# Patient Record
Sex: FEMALE | Race: BLACK OR AFRICAN AMERICAN | NOT HISPANIC OR LATINO | Employment: UNEMPLOYED | URBAN - METROPOLITAN AREA
[De-identification: names, ages, dates, MRNs, and addresses within clinical notes are randomized per-mention and may not be internally consistent; named-entity substitution may affect disease eponyms.]

---

## 2017-06-29 ENCOUNTER — APPOINTMENT (OUTPATIENT)
Dept: URGENT CARE | Facility: MEDICAL CENTER | Age: 22
End: 2017-06-29

## 2017-06-29 ENCOUNTER — APPOINTMENT (OUTPATIENT)
Dept: RADIOLOGY | Facility: MEDICAL CENTER | Age: 22
End: 2017-06-29
Attending: PHYSICIAN ASSISTANT

## 2017-06-29 ENCOUNTER — TRANSCRIBE ORDERS (OUTPATIENT)
Dept: URGENT CARE | Facility: MEDICAL CENTER | Age: 22
End: 2017-06-29

## 2017-06-29 DIAGNOSIS — Z20.1 EXPOSURE TO TB: Primary | ICD-10-CM

## 2017-06-29 DIAGNOSIS — Z20.1 EXPOSURE TO TB: ICD-10-CM

## 2017-06-29 PROCEDURE — 71020 HB CHEST X-RAY 2VW FRONTAL&LATL: CPT

## 2024-07-16 ENCOUNTER — APPOINTMENT (EMERGENCY)
Dept: CT IMAGING | Facility: HOSPITAL | Age: 29
End: 2024-07-16
Payer: COMMERCIAL

## 2024-07-16 ENCOUNTER — HOSPITAL ENCOUNTER (EMERGENCY)
Facility: HOSPITAL | Age: 29
Discharge: HOME | End: 2024-07-16
Attending: EMERGENCY MEDICINE | Admitting: EMERGENCY MEDICINE
Payer: COMMERCIAL

## 2024-07-16 VITALS
HEIGHT: 63 IN | HEART RATE: 75 BPM | SYSTOLIC BLOOD PRESSURE: 148 MMHG | WEIGHT: 137.35 LBS | RESPIRATION RATE: 15 BRPM | BODY MASS INDEX: 24.34 KG/M2 | TEMPERATURE: 98.7 F | DIASTOLIC BLOOD PRESSURE: 93 MMHG | OXYGEN SATURATION: 100 %

## 2024-07-16 DIAGNOSIS — R10.9 ABDOMINAL PAIN: Primary | ICD-10-CM

## 2024-07-16 LAB
ALBUMIN SERPL BCG-MCNC: 4.6 G/DL (ref 3.5–5)
ALP SERPL-CCNC: 74 U/L (ref 34–104)
ALT SERPL W P-5'-P-CCNC: 7 U/L (ref 7–52)
ANION GAP SERPL CALCULATED.3IONS-SCNC: 6 MMOL/L (ref 4–13)
AST SERPL W P-5'-P-CCNC: 14 U/L (ref 13–39)
BACTERIA UR QL AUTO: ABNORMAL /HPF
BASOPHILS # BLD AUTO: 0.02 THOUSANDS/ÂΜL (ref 0–0.1)
BASOPHILS NFR BLD AUTO: 1 % (ref 0–1)
BILIRUB SERPL-MCNC: 0.4 MG/DL (ref 0.2–1)
BILIRUB UR QL STRIP: NEGATIVE
BUN SERPL-MCNC: 7 MG/DL (ref 5–25)
CALCIUM SERPL-MCNC: 9.8 MG/DL (ref 8.4–10.2)
CHLORIDE SERPL-SCNC: 102 MMOL/L (ref 96–108)
CLARITY UR: CLEAR
CO2 SERPL-SCNC: 30 MMOL/L (ref 21–32)
COLOR UR: ABNORMAL
CREAT SERPL-MCNC: 0.67 MG/DL (ref 0.6–1.3)
EOSINOPHIL # BLD AUTO: 0.07 THOUSAND/ÂΜL (ref 0–0.61)
EOSINOPHIL NFR BLD AUTO: 2 % (ref 0–6)
ERYTHROCYTE [DISTWIDTH] IN BLOOD BY AUTOMATED COUNT: 12.7 % (ref 11.6–15.1)
EXT PREGNANCY TEST URINE: NEGATIVE
EXT. CONTROL: NORMAL
FLUAV RNA RESP QL NAA+PROBE: NEGATIVE
FLUBV RNA RESP QL NAA+PROBE: NEGATIVE
GFR SERPL CREATININE-BSD FRML MDRD: 117 ML/MIN/1.73SQ M
GLUCOSE SERPL-MCNC: 87 MG/DL (ref 65–140)
GLUCOSE UR STRIP-MCNC: NEGATIVE MG/DL
HCG SERPL QL: NEGATIVE
HCT VFR BLD AUTO: 37.5 % (ref 34.8–46.1)
HGB BLD-MCNC: 12 G/DL (ref 11.5–15.4)
HGB UR QL STRIP.AUTO: NEGATIVE
IMM GRANULOCYTES # BLD AUTO: 0.01 THOUSAND/UL (ref 0–0.2)
IMM GRANULOCYTES NFR BLD AUTO: 0 % (ref 0–2)
KETONES UR STRIP-MCNC: NEGATIVE MG/DL
LEUKOCYTE ESTERASE UR QL STRIP: ABNORMAL
LIPASE SERPL-CCNC: <6 U/L (ref 11–82)
LYMPHOCYTES # BLD AUTO: 1.51 THOUSANDS/ÂΜL (ref 0.6–4.47)
LYMPHOCYTES NFR BLD AUTO: 39 % (ref 14–44)
MCH RBC QN AUTO: 26.2 PG (ref 26.8–34.3)
MCHC RBC AUTO-ENTMCNC: 32 G/DL (ref 31.4–37.4)
MCV RBC AUTO: 82 FL (ref 82–98)
MONOCYTES # BLD AUTO: 0.35 THOUSAND/ÂΜL (ref 0.17–1.22)
MONOCYTES NFR BLD AUTO: 9 % (ref 4–12)
MUCOUS THREADS UR QL AUTO: ABNORMAL
NEUTROPHILS # BLD AUTO: 1.87 THOUSANDS/ÂΜL (ref 1.85–7.62)
NEUTS SEG NFR BLD AUTO: 49 % (ref 43–75)
NITRITE UR QL STRIP: NEGATIVE
NON-SQ EPI CELLS URNS QL MICRO: ABNORMAL /HPF
NRBC BLD AUTO-RTO: 0 /100 WBCS
PH UR STRIP.AUTO: 6.5 [PH]
PLATELET # BLD AUTO: 324 THOUSANDS/UL (ref 149–390)
PMV BLD AUTO: 9.5 FL (ref 8.9–12.7)
POTASSIUM SERPL-SCNC: 3.9 MMOL/L (ref 3.5–5.3)
PROT SERPL-MCNC: 6.9 G/DL (ref 6.4–8.4)
PROT UR STRIP-MCNC: NEGATIVE MG/DL
RBC # BLD AUTO: 4.58 MILLION/UL (ref 3.81–5.12)
RBC #/AREA URNS AUTO: ABNORMAL /HPF
RSV RNA RESP QL NAA+PROBE: NEGATIVE
SARS-COV-2 RNA RESP QL NAA+PROBE: NEGATIVE
SODIUM SERPL-SCNC: 138 MMOL/L (ref 135–147)
SP GR UR STRIP.AUTO: 1.01 (ref 1–1.03)
UROBILINOGEN UR STRIP-ACNC: <2 MG/DL
WBC # BLD AUTO: 3.83 THOUSAND/UL (ref 4.31–10.16)
WBC #/AREA URNS AUTO: ABNORMAL /HPF

## 2024-07-16 PROCEDURE — 99284 EMERGENCY DEPT VISIT MOD MDM: CPT

## 2024-07-16 PROCEDURE — 96361 HYDRATE IV INFUSION ADD-ON: CPT

## 2024-07-16 PROCEDURE — 83690 ASSAY OF LIPASE: CPT | Performed by: EMERGENCY MEDICINE

## 2024-07-16 PROCEDURE — 99284 EMERGENCY DEPT VISIT MOD MDM: CPT | Performed by: EMERGENCY MEDICINE

## 2024-07-16 PROCEDURE — 0241U HB NFCT DS VIR RESP RNA 4 TRGT: CPT | Performed by: EMERGENCY MEDICINE

## 2024-07-16 PROCEDURE — 74177 CT ABD & PELVIS W/CONTRAST: CPT

## 2024-07-16 PROCEDURE — 80053 COMPREHEN METABOLIC PANEL: CPT | Performed by: EMERGENCY MEDICINE

## 2024-07-16 PROCEDURE — 71260 CT THORAX DX C+: CPT

## 2024-07-16 PROCEDURE — 81025 URINE PREGNANCY TEST: CPT | Performed by: EMERGENCY MEDICINE

## 2024-07-16 PROCEDURE — 84703 CHORIONIC GONADOTROPIN ASSAY: CPT | Performed by: EMERGENCY MEDICINE

## 2024-07-16 PROCEDURE — 81001 URINALYSIS AUTO W/SCOPE: CPT | Performed by: EMERGENCY MEDICINE

## 2024-07-16 PROCEDURE — 96360 HYDRATION IV INFUSION INIT: CPT

## 2024-07-16 PROCEDURE — 36415 COLL VENOUS BLD VENIPUNCTURE: CPT | Performed by: EMERGENCY MEDICINE

## 2024-07-16 PROCEDURE — 85025 COMPLETE CBC W/AUTO DIFF WBC: CPT | Performed by: EMERGENCY MEDICINE

## 2024-07-16 RX ADMIN — IOHEXOL 100 ML: 350 INJECTION, SOLUTION INTRAVENOUS at 21:17

## 2024-07-16 RX ADMIN — SODIUM CHLORIDE 1000 ML: 0.9 INJECTION, SOLUTION INTRAVENOUS at 19:59

## 2024-07-17 NOTE — ED PROVIDER NOTES
"History  Chief Complaint   Patient presents with    Bloated     Patient co abdominal bloating that started \"weeks ago.\" Patient states \"it feels like cramping and pain.\"  Patient states \"It reminds me of when I had cancer my belly would bloat.\" Patient reports she is currently in remission and had finished her last tx April 2024 for non hodgkin's Lymphoma.      28 y/o female, hx of lymphoma, presents to the ED for abd pain and bloating. She states that over the last few weeks she has had generalized abdominal pain and bloating.  She denies any fever/chills, chest pain, shortness of breath, or urinary symptoms.  States that she has had nausea but denies any vomiting.  Also reports diarrhea which she has also had for the last few weeks.  She states that she has had similar symptoms in the past when she was first diagnosed with lymphoma.  States that she is not currently on any chemo and is in remission.  States that her last dose of chemotherapy was in April.  No other complaints.      History provided by:  Patient      None       Past Medical History:   Diagnosis Date    Lymphoma (HCC)        Past Surgical History:   Procedure Laterality Date    CT NEEDLE BIOPSY MEDIASTINUM  12/28/2023    LAPAROSCOPIC OOPHORECTOMY Right     US GUIDED THYROID BIOPSY  12/29/2023       History reviewed. No pertinent family history.  I have reviewed and agree with the history as documented.    E-Cigarette/Vaping     E-Cigarette/Vaping Substances     Social History     Tobacco Use    Smoking status: Never    Smokeless tobacco: Never   Substance Use Topics    Alcohol use: Not Currently    Drug use: Yes     Types: Marijuana       Review of Systems   Constitutional:  Negative for chills and fever.   HENT:  Negative for congestion, ear pain and sore throat.    Eyes:  Negative for pain and visual disturbance.   Respiratory:  Negative for cough, shortness of breath and wheezing.    Cardiovascular:  Negative for chest pain and leg swelling. "   Gastrointestinal:  Positive for abdominal pain. Negative for diarrhea, nausea and vomiting.   Genitourinary:  Negative for dysuria, frequency, hematuria and urgency.   Musculoskeletal:  Negative for neck pain and neck stiffness.   Skin:  Negative for rash and wound.   Neurological:  Negative for weakness, numbness and headaches.   Psychiatric/Behavioral:  Negative for agitation and confusion.    All other systems reviewed and are negative.      Physical Exam  Physical Exam  Vitals and nursing note reviewed.   Constitutional:       Appearance: She is well-developed.   HENT:      Head: Normocephalic and atraumatic.   Eyes:      Pupils: Pupils are equal, round, and reactive to light.   Cardiovascular:      Rate and Rhythm: Normal rate and regular rhythm.   Pulmonary:      Effort: Pulmonary effort is normal.      Breath sounds: Normal breath sounds.   Abdominal:      General: Bowel sounds are normal.      Palpations: Abdomen is soft.      Tenderness: There is no abdominal tenderness.   Musculoskeletal:         General: Normal range of motion.      Cervical back: Normal range of motion and neck supple.   Skin:     General: Skin is warm and dry.   Neurological:      General: No focal deficit present.      Mental Status: She is alert and oriented to person, place, and time.      Comments: No focal deficits         Vital Signs  ED Triage Vitals [07/16/24 1925]   Temperature Pulse Respirations Blood Pressure SpO2   98.7 °F (37.1 °C) 97 16 128/90 100 %      Temp Source Heart Rate Source Patient Position - Orthostatic VS BP Location FiO2 (%)   Oral Monitor Sitting Left arm --      Pain Score       --           Vitals:    07/16/24 1925 07/16/24 2130 07/16/24 2200   BP: 128/90 130/72 148/93   Pulse: 97 83 75   Patient Position - Orthostatic VS: Sitting           Visual Acuity  Visual Acuity      Flowsheet Row Most Recent Value   L Pupil Size (mm) 3   R Pupil Size (mm) 3            ED Medications  Medications   sodium chloride  0.9 % bolus 1,000 mL (0 mL Intravenous Stopped 7/16/24 2206)   iohexol (OMNIPAQUE) 350 MG/ML injection (MULTI-DOSE) 100 mL (100 mL Intravenous Given 7/16/24 2117)       Diagnostic Studies  Results Reviewed       Procedure Component Value Units Date/Time    FLU/RSV/COVID - if FLU/RSV clinically relevant [[id number]]  (Normal) Collected: 07/16/24 2055    Lab Status: Final result Specimen: Nares from Nose Updated: 07/16/24 2147     SARS-CoV-2 Negative     INFLUENZA A PCR Negative     INFLUENZA B PCR Negative     RSV PCR Negative    Narrative:      FOR PEDIATRIC PATIENTS - copy/paste COVID Guidelines URL to browser: [email or web address]    SARS-CoV-2 assay is a Nucleic Acid Amplification assay intended for the  qualitative detection of nucleic acid from SARS-CoV-2 in nasopharyngeal  swabs. Results are for the presumptive identification of SARS-CoV-2 RNA.    Positive results are indicative of infection with SARS-CoV-2, the virus  causing COVID-19, but do not rule out bacterial infection or co-infection  with other viruses. Laboratories within the [place] and its  territories are required to report all positive results to the appropriate  public health authorities. Negative results do not preclude SARS-CoV-2  infection and should not be used as the sole basis for treatment or other  patient management decisions. Negative results must be combined with  clinical observations, patient history, and epidemiological information.  This test has not been FDA cleared or approved.    This test has been authorized by FDA under an Emergency Use Authorization  (EUA). This test is only authorized for the duration of time the  declaration that circumstances exist justifying the authorization of the  emergency use of an in vitro diagnostic tests for detection of SARS-CoV-2  virus and/or diagnosis of COVID-19 infection under section 564(b)(1) of  the Act, 21 U.S.C. 360bbb-3(b)(1),  unless the authorization is terminated  or revoked sooner. The test has been validated but independent review by FDA  and CLIA is pending.    Test performed using [employer] GeneXpert: This RT-PCR assay targets N2,  a region unique to SARS-CoV-2. A conserved region in the E-gene was chosen  for pan-Sarbecovirus detection which includes SARS-CoV-2.    According to CMS-2020-01-R, this platform meets the definition of high-throughput technology.    Comprehensive metabolic panel [[id number]] Collected: 07/16/24 1959    Lab Status: Final result Specimen: Blood from Arm, Left Updated: 07/16/24 2107     Sodium 138 mmol/L      Potassium 3.9 mmol/L      Chloride 102 mmol/L      CO2 30 mmol/L      ANION GAP 6 mmol/L      BUN 7 mg/dL      Creatinine 0.67 mg/dL      Glucose 87 mg/dL      Calcium 9.8 mg/dL      AST 14 U/L      ALT 7 U/L      Alkaline Phosphatase 74 U/L      Total Protein 6.9 g/dL      Albumin 4.6 g/dL      Total Bilirubin 0.40 mg/dL      eGFR 117 ml/min/1.73sq m     Narrative:      National Kidney Disease Foundation guidelines for Chronic Kidney Disease (CKD):     Stage 1 with normal or high GFR (GFR > 90 mL/min/1.73 square meters)    Stage 2 Mild CKD (GFR = 60-89 mL/min/1.73 square meters)    Stage 3A Moderate CKD (GFR = 45-59 mL/min/1.73 square meters)    Stage 3B Moderate CKD (GFR = 30-44 mL/min/1.73 square meters)    Stage 4 Severe CKD (GFR = 15-29 mL/min/1.73 square meters)    Stage 5 End Stage CKD (GFR <15 mL/min/1.73 square meters)  Note: GFR calculation is accurate only with a steady state creatinine    Lipase [[id number]]  (Abnormal) Collected: 07/16/24 1959    Lab Status: Final result Specimen: Blood from Arm, Left Updated: 07/16/24 2107     Lipase <6 u/L     hCG, qualitative pregnancy [[id number]]  (Normal) Collected: 07/16/24 1959    Lab Status: Final result Specimen: Blood from Arm, Left Updated: 07/16/24 2039     Preg, Serum Negative    Urine Microscopic [[id number]]  (Abnormal) Collected: 07/16/24 1954     Lab Status: Final result Specimen: Urine, Clean Catch Updated: 07/16/24 2015     RBC, UA 1-2 /hpf      WBC, UA 4-10 /hpf      Epithelial Cells Occasional /hpf      Bacteria, UA Occasional /hpf      MUCUS THREADS Occasional    UA w Reflex to Microscopic w Reflex to Culture [[id number]]  (Abnormal) Collected: 07/16/24 1954    Lab Status: Final result Specimen: Urine, Clean Catch Updated: 07/16/24 2011     Color, UA Light Yellow     Clarity, UA Clear     Specific Gravity, UA 1.013     pH, UA 6.5     Leukocytes, UA Trace     Nitrite, UA Negative     Protein, UA Negative mg/dl      Glucose, UA Negative mg/dl      Ketones, UA Negative mg/dl      Urobilinogen, UA <2.0 mg/dl      Bilirubin, UA Negative     Occult Blood, UA Negative    CBC and differential [[id number]]  (Abnormal) Collected: 07/16/24 1959    Lab Status: Final result Specimen: Blood from Arm, Left Updated: 07/16/24 2008     WBC 3.83 Thousand/uL      RBC 4.58 Million/uL      Hemoglobin 12.0 g/dL      Hematocrit 37.5 %      MCV 82 fL      MCH 26.2 pg      MCHC 32.0 g/dL      RDW 12.7 %      MPV 9.5 fL      Platelets 324 Thousands/uL      nRBC 0 /100 WBCs      Segmented % 49 %      Immature Grans % 0 %      Lymphocytes % 39 %      Monocytes % 9 %      Eosinophils Relative 2 %      Basophils Relative 1 %      Absolute Neutrophils 1.87 Thousands/µL      Absolute Immature Grans 0.01 Thousand/uL      Absolute Lymphocytes 1.51 Thousands/µL      Absolute Monocytes 0.35 Thousand/µL      Eosinophils Absolute 0.07 Thousand/µL      Basophils Absolute 0.02 Thousands/µL     POCT pregnancy, urine [[id number]]  (Normal) Resulted: 07/16/24 1954    Lab Status: Final result Specimen: Urine Updated: 07/16/24 1955     EXT Preg Test, Ur Negative     Control Valid                   CT chest abdomen pelvis w contrast   Final Result by Pedro Beasley MD (07/16 2154)         1. No acute abnormality identified in the chest, abdomen, or pelvis.   2. 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 This was described as measuring 2.9 x 1.7 cm on outside PET/CT dated 5/31/2024. Direct comparison with that    study suggested.   3. Simple appearing 4.0 x 2.3 cm right adnexal cyst not necessitating follow-up.   4. Additional findings as noted.                     Workstation performed: [id number]                    Procedures  Procedures         ED Course                                 SBIRT 22yo+      Flowsheet Row Most Recent Value   Initial Alcohol Screen: US AUDIT-C     1. How often do you have a drink containing alcohol? 0 Filed at: 07/16/2024 1929   2. How many drinks containing alcohol do you have on a typical day you are drinking?  0 Filed at: 07/16/2024 1929   3a. Male UNDER 65: How often do you have five or more drinks on one occasion? 0 Filed at: 07/16/2024 1929   3b. FEMALE Any Age, or MALE 65+: How often do you have 4 or more drinks on one occassion? 0 Filed at: 07/16/2024 1929   Audit-C Score 0 Filed at: 07/16/2024 1929   DION: How many times in the past year have you...    Used an illegal drug or used a prescription medication for non-medical reasons? Never Filed at: 07/16/2024 1929                      Medical Decision Making  29-year-old female with abdominal pain-will get labs, COVID/flu/RSV, and CT scan.  Offered pain medication but states that she does not want any at this time.  Will reassess for dispo.    Amount and/or Complexity of Data Reviewed  Labs: ordered.  Radiology: ordered.    Risk  Prescription drug management.                 Disposition  Final diagnoses:   Abdominal pain     Time reflects when diagnosis was documented in both MDM as applicable and the Disposition within this note       Time User Action Codes Description Comment    7/16/2024  9:59 PM Guillermina Romero Add [R10.9] Abdominal pain           ED Disposition       ED Disposition   Discharge    Condition    Stable    Date/Time   Tue Jul 16, 2024 2159    Comment   Dean Lancaster discharge to home/self care.                   Follow-up Information       Follow up With Specialties Details Why Contact Info Additional Information    your family doctor  Call in 1 day for follow up within 2-3 days      [facility] Emergency Medicine Go to  immediately for any new or worsening symptoms [address] [facility] [id number] [phone] [facility], [address], [place], [address]            There are no discharge medications for this patient.      No discharge procedures on file.    PDMP Review       None            ED Provider  Electronically Signed by             Guillermina Romero DO  07/16/24 1360

## 2024-08-13 ENCOUNTER — HOSPITAL ENCOUNTER (OUTPATIENT)
Facility: HOSPITAL | Age: 29
Setting detail: OBSERVATION
Discharge: HOME | End: 2024-08-14
Attending: EMERGENCY MEDICINE | Admitting: EMERGENCY MEDICINE
Payer: COMMERCIAL

## 2024-08-13 DIAGNOSIS — I31.9 PERICARDITIS: Primary | ICD-10-CM

## 2024-08-13 DIAGNOSIS — I31.39 PERICARDIAL EFFUSION: ICD-10-CM

## 2024-08-13 PROCEDURE — 84484 ASSAY OF TROPONIN QUANT: CPT | Performed by: EMERGENCY MEDICINE

## 2024-08-13 PROCEDURE — 93005 ELECTROCARDIOGRAM TRACING: CPT

## 2024-08-13 PROCEDURE — 99285 EMERGENCY DEPT VISIT HI MDM: CPT

## 2024-08-13 PROCEDURE — 80053 COMPREHEN METABOLIC PANEL: CPT | Performed by: EMERGENCY MEDICINE

## 2024-08-13 PROCEDURE — 85025 COMPLETE CBC W/AUTO DIFF WBC: CPT | Performed by: EMERGENCY MEDICINE

## 2024-08-13 PROCEDURE — 36415 COLL VENOUS BLD VENIPUNCTURE: CPT

## 2024-08-13 NOTE — LETTER
[address]  [facility] [id number]  No information on file.    August 14, 2024     Patient: Dean Lancaster   YOB: 1995   Date of Visit: 8/13/2024       To Whom it May Concern:    Dean Lancaster is under my professional care. She was seen in the hospital from 8/13/2024 to 08/14/24. She may return to work on Monday August 19th, 2024 with the following limitations Start with 3 consecutive days then rest; then 4 days consecutive then rest; then resume 5 consecutive days thereafter or as directed by her PCP .    If you have any questions or concerns, please don't hesitate to call.         Sincerely,          DIMITRI Wooten

## 2024-08-14 ENCOUNTER — APPOINTMENT (EMERGENCY)
Dept: CT IMAGING | Facility: HOSPITAL | Age: 29
End: 2024-08-14
Payer: COMMERCIAL

## 2024-08-14 ENCOUNTER — TELEPHONE (OUTPATIENT)
Dept: CARDIOLOGY CLINIC | Facility: CLINIC | Age: 29
End: 2024-08-14

## 2024-08-14 ENCOUNTER — APPOINTMENT (OUTPATIENT)
Dept: NON INVASIVE DIAGNOSTICS | Facility: HOSPITAL | Age: 29
End: 2024-08-14
Payer: COMMERCIAL

## 2024-08-14 ENCOUNTER — APPOINTMENT (EMERGENCY)
Dept: RADIOLOGY | Facility: HOSPITAL | Age: 29
End: 2024-08-14
Payer: COMMERCIAL

## 2024-08-14 VITALS
DIASTOLIC BLOOD PRESSURE: 88 MMHG | OXYGEN SATURATION: 97 % | BODY MASS INDEX: 23.04 KG/M2 | HEIGHT: 63 IN | WEIGHT: 130 LBS | RESPIRATION RATE: 16 BRPM | HEART RATE: 99 BPM | SYSTOLIC BLOOD PRESSURE: 121 MMHG | TEMPERATURE: 98.3 F

## 2024-08-14 PROBLEM — I31.39 PERICARDIAL EFFUSION: Status: ACTIVE | Noted: 2024-08-14

## 2024-08-14 PROBLEM — I31.9 PERICARDITIS: Status: ACTIVE | Noted: 2024-08-14

## 2024-08-14 PROBLEM — R07.9 CHEST PAIN: Status: ACTIVE | Noted: 2024-08-14

## 2024-08-14 PROBLEM — C85.10 B-CELL LYMPHOMA (HCC): Status: ACTIVE | Noted: 2024-08-14

## 2024-08-14 LAB
2HR DELTA HS TROPONIN: -1 NG/L
2HR DELTA HS TROPONIN: 1 NG/L
4HR DELTA HS TROPONIN: -1 NG/L
ALBUMIN SERPL BCG-MCNC: 4.7 G/DL (ref 3.5–5)
ALP SERPL-CCNC: 76 U/L (ref 34–104)
ALT SERPL W P-5'-P-CCNC: 7 U/L (ref 7–52)
ANION GAP SERPL CALCULATED.3IONS-SCNC: 10 MMOL/L (ref 4–13)
AORTIC ROOT: 2.6 CM
APICAL FOUR CHAMBER EJECTION FRACTION: 55 %
ASCENDING AORTA: 2.9 CM
AST SERPL W P-5'-P-CCNC: 15 U/L (ref 13–39)
ATRIAL RATE: 102 BPM
ATRIAL RATE: 85 BPM
ATRIAL RATE: 88 BPM
ATRIAL RATE: 95 BPM
AV LVOT MEAN GRADIENT: 1 MMHG
AV LVOT PEAK GRADIENT: 3 MMHG
BASOPHILS # BLD AUTO: 0.04 THOUSANDS/ÂΜL (ref 0–0.1)
BASOPHILS NFR BLD AUTO: 0 % (ref 0–1)
BILIRUB SERPL-MCNC: 0.59 MG/DL (ref 0.2–1)
BSA FOR ECHO PROCEDURE: 1.61 M2
BUN SERPL-MCNC: 6 MG/DL (ref 5–25)
CALCIUM SERPL-MCNC: 9.9 MG/DL (ref 8.4–10.2)
CARDIAC TROPONIN I PNL SERPL HS: 14 NG/L
CARDIAC TROPONIN I PNL SERPL HS: 15 NG/L
CARDIAC TROPONIN I PNL SERPL HS: 15 NG/L
CARDIAC TROPONIN I PNL SERPL HS: 16 NG/L
CARDIAC TROPONIN I PNL SERPL HS: 17 NG/L
CHLORIDE SERPL-SCNC: 100 MMOL/L (ref 96–108)
CO2 SERPL-SCNC: 27 MMOL/L (ref 21–32)
CREAT SERPL-MCNC: 0.7 MG/DL (ref 0.6–1.3)
D DIMER PPP FEU-MCNC: 1.81 UG/ML FEU
DOP CALC LVOT PEAK VEL VTI: 13.7 CM
DOP CALC LVOT PEAK VEL: 0.81 M/S
E WAVE DECELERATION TIME: 169 MS
E/A RATIO: 1.33
EOSINOPHIL # BLD AUTO: 0.06 THOUSAND/ÂΜL (ref 0–0.61)
EOSINOPHIL NFR BLD AUTO: 1 % (ref 0–6)
ERYTHROCYTE [DISTWIDTH] IN BLOOD BY AUTOMATED COUNT: 13.1 % (ref 11.6–15.1)
EXT PREGNANCY TEST URINE: NEGATIVE
EXT. CONTROL: NORMAL
FRACTIONAL SHORTENING: 30 (ref 28–44)
GFR SERPL CREATININE-BSD FRML MDRD: 115 ML/MIN/1.73SQ M
GLUCOSE SERPL-MCNC: 111 MG/DL (ref 65–140)
HCT VFR BLD AUTO: 36.9 % (ref 34.8–46.1)
HGB BLD-MCNC: 12.2 G/DL (ref 11.5–15.4)
IMM GRANULOCYTES # BLD AUTO: 0.09 THOUSAND/UL (ref 0–0.2)
IMM GRANULOCYTES NFR BLD AUTO: 1 % (ref 0–2)
INTERVENTRICULAR SEPTUM IN DIASTOLE (PARASTERNAL SHORT AXIS VIEW): 1 CM
INTERVENTRICULAR SEPTUM: 1 CM (ref 0.6–1.1)
LA/AORTA RATIO 2D: 1.04
LAAS-AP2: 11.5 CM2
LAAS-AP4: 9.1 CM2
LEFT ATRIUM SIZE: 2.7 CM
LEFT ATRIUM VOLUME (MOD BIPLANE): 23 ML
LEFT ATRIUM VOLUME INDEX (MOD BIPLANE): 14.3 ML/M2
LEFT INTERNAL DIMENSION IN SYSTOLE: 2.8 CM (ref 2.1–4)
LEFT VENTRICULAR INTERNAL DIMENSION IN DIASTOLE: 4 CM (ref 3.5–6)
LEFT VENTRICULAR POSTERIOR WALL IN END DIASTOLE: 1 CM
LEFT VENTRICULAR STROKE VOLUME: 40 ML
LVSV (TEICH): 40 ML
LYMPHOCYTES # BLD AUTO: 1.36 THOUSANDS/ÂΜL (ref 0.6–4.47)
LYMPHOCYTES NFR BLD AUTO: 14 % (ref 14–44)
MCH RBC QN AUTO: 26.5 PG (ref 26.8–34.3)
MCHC RBC AUTO-ENTMCNC: 33.1 G/DL (ref 31.4–37.4)
MCV RBC AUTO: 80 FL (ref 82–98)
MONOCYTES # BLD AUTO: 1.12 THOUSAND/ÂΜL (ref 0.17–1.22)
MONOCYTES NFR BLD AUTO: 11 % (ref 4–12)
MV E'TISSUE VEL-SEP: 13 CM/S
MV PEAK A VEL: 0.57 M/S
MV PEAK E VEL: 76 CM/S
MV STENOSIS PRESSURE HALF TIME: 49 MS
MV VALVE AREA P 1/2 METHOD: 4.49
NEUTROPHILS # BLD AUTO: 7.12 THOUSANDS/ÂΜL (ref 1.85–7.62)
NEUTS SEG NFR BLD AUTO: 73 % (ref 43–75)
NRBC BLD AUTO-RTO: 0 /100 WBCS
P AXIS: 45 DEGREES
P AXIS: 47 DEGREES
P AXIS: 53 DEGREES
P AXIS: 53 DEGREES
PLATELET # BLD AUTO: 297 THOUSANDS/UL (ref 149–390)
PMV BLD AUTO: 9.5 FL (ref 8.9–12.7)
POTASSIUM SERPL-SCNC: 3.8 MMOL/L (ref 3.5–5.3)
PR INTERVAL: 126 MS
PR INTERVAL: 126 MS
PR INTERVAL: 128 MS
PR INTERVAL: 136 MS
PROT SERPL-MCNC: 7.5 G/DL (ref 6.4–8.4)
QRS AXIS: 58 DEGREES
QRS AXIS: 62 DEGREES
QRS AXIS: 65 DEGREES
QRS AXIS: 66 DEGREES
QRSD INTERVAL: 76 MS
QRSD INTERVAL: 80 MS
QRSD INTERVAL: 80 MS
QRSD INTERVAL: 84 MS
QT INTERVAL: 320 MS
QT INTERVAL: 332 MS
QT INTERVAL: 356 MS
QT INTERVAL: 374 MS
QTC INTERVAL: 417 MS
QTC INTERVAL: 417 MS
QTC INTERVAL: 430 MS
QTC INTERVAL: 445 MS
RBC # BLD AUTO: 4.61 MILLION/UL (ref 3.81–5.12)
RIGHT VENTRICLE ID DIMENSION: 3.5 CM
SL CV LV EF: 60
SL CV PED ECHO LEFT VENTRICLE DIASTOLIC VOLUME (MOD BIPLANE) 2D: 70 ML
SL CV PED ECHO LEFT VENTRICLE SYSTOLIC VOLUME (MOD BIPLANE) 2D: 30 ML
SODIUM SERPL-SCNC: 137 MMOL/L (ref 135–147)
T WAVE AXIS: 45 DEGREES
T WAVE AXIS: 46 DEGREES
T WAVE AXIS: 51 DEGREES
T WAVE AXIS: 57 DEGREES
TR MAX PG: 21 MMHG
TR PEAK VELOCITY: 2.3 M/S
TRICUSPID ANNULAR PLANE SYSTOLIC EXCURSION: 1.9 CM
TRICUSPID VALVE PEAK REGURGITATION VELOCITY: 2.3 M/S
VENTRICULAR RATE: 102 BPM
VENTRICULAR RATE: 85 BPM
VENTRICULAR RATE: 88 BPM
VENTRICULAR RATE: 95 BPM
WBC # BLD AUTO: 9.79 THOUSAND/UL (ref 4.31–10.16)

## 2024-08-14 PROCEDURE — 93010 ELECTROCARDIOGRAM REPORT: CPT | Performed by: INTERNAL MEDICINE

## 2024-08-14 PROCEDURE — 84484 ASSAY OF TROPONIN QUANT: CPT | Performed by: EMERGENCY MEDICINE

## 2024-08-14 PROCEDURE — 96374 THER/PROPH/DIAG INJ IV PUSH: CPT

## 2024-08-14 PROCEDURE — 93005 ELECTROCARDIOGRAM TRACING: CPT

## 2024-08-14 PROCEDURE — 99285 EMERGENCY DEPT VISIT HI MDM: CPT | Performed by: EMERGENCY MEDICINE

## 2024-08-14 PROCEDURE — 93306 TTE W/DOPPLER COMPLETE: CPT

## 2024-08-14 PROCEDURE — 36415 COLL VENOUS BLD VENIPUNCTURE: CPT | Performed by: EMERGENCY MEDICINE

## 2024-08-14 PROCEDURE — 71275 CT ANGIOGRAPHY CHEST: CPT

## 2024-08-14 PROCEDURE — 99244 OFF/OP CNSLTJ NEW/EST MOD 40: CPT | Performed by: INTERNAL MEDICINE

## 2024-08-14 PROCEDURE — 84484 ASSAY OF TROPONIN QUANT: CPT | Performed by: INTERNAL MEDICINE

## 2024-08-14 PROCEDURE — 71046 X-RAY EXAM CHEST 2 VIEWS: CPT

## 2024-08-14 PROCEDURE — 99236 HOSP IP/OBS SAME DATE HI 85: CPT | Performed by: INTERNAL MEDICINE

## 2024-08-14 PROCEDURE — NC001 PR NO CHARGE: Performed by: NURSE PRACTITIONER

## 2024-08-14 PROCEDURE — 81025 URINE PREGNANCY TEST: CPT | Performed by: EMERGENCY MEDICINE

## 2024-08-14 PROCEDURE — 74177 CT ABD & PELVIS W/CONTRAST: CPT

## 2024-08-14 PROCEDURE — 85379 FIBRIN DEGRADATION QUANT: CPT | Performed by: NURSE PRACTITIONER

## 2024-08-14 PROCEDURE — 93306 TTE W/DOPPLER COMPLETE: CPT | Performed by: STUDENT IN AN ORGANIZED HEALTH CARE EDUCATION/TRAINING PROGRAM

## 2024-08-14 RX ORDER — IBUPROFEN 400 MG/1
400 TABLET, FILM COATED ORAL EVERY 8 HOURS SCHEDULED
Qty: 15 TABLET | Refills: 0 | Status: SHIPPED | OUTPATIENT
Start: 2024-08-14 | End: 2024-08-19

## 2024-08-14 RX ORDER — COLCHICINE 0.6 MG/1
0.6 TABLET ORAL ONCE
Status: COMPLETED | OUTPATIENT
Start: 2024-08-14 | End: 2024-08-14

## 2024-08-14 RX ORDER — ASPIRIN 81 MG/1
81 TABLET, CHEWABLE ORAL DAILY
COMMUNITY

## 2024-08-14 RX ORDER — COLCHICINE 0.6 MG/1
0.6 TABLET ORAL DAILY
Status: DISCONTINUED | OUTPATIENT
Start: 2024-08-15 | End: 2024-08-14

## 2024-08-14 RX ORDER — ASPIRIN 81 MG/1
81 TABLET, CHEWABLE ORAL DAILY
Status: DISCONTINUED | OUTPATIENT
Start: 2024-08-14 | End: 2024-08-14 | Stop reason: HOSPADM

## 2024-08-14 RX ORDER — COLCHICINE 0.6 MG/1
0.6 TABLET ORAL 2 TIMES DAILY
Qty: 60 TABLET | Refills: 0 | Status: SHIPPED | OUTPATIENT
Start: 2024-08-14 | End: 2024-09-13

## 2024-08-14 RX ORDER — IBUPROFEN 400 MG/1
400 TABLET, FILM COATED ORAL EVERY 8 HOURS SCHEDULED
Status: DISCONTINUED | OUTPATIENT
Start: 2024-08-14 | End: 2024-08-14 | Stop reason: HOSPADM

## 2024-08-14 RX ORDER — ACETAMINOPHEN 325 MG/1
650 TABLET ORAL EVERY 6 HOURS PRN
Status: DISCONTINUED | OUTPATIENT
Start: 2024-08-14 | End: 2024-08-14 | Stop reason: HOSPADM

## 2024-08-14 RX ORDER — KETOROLAC TROMETHAMINE 30 MG/ML
15 INJECTION, SOLUTION INTRAMUSCULAR; INTRAVENOUS EVERY 6 HOURS SCHEDULED
Status: DISCONTINUED | OUTPATIENT
Start: 2024-08-14 | End: 2024-08-14

## 2024-08-14 RX ORDER — KETOROLAC TROMETHAMINE 30 MG/ML
15 INJECTION, SOLUTION INTRAMUSCULAR; INTRAVENOUS ONCE
Status: COMPLETED | OUTPATIENT
Start: 2024-08-14 | End: 2024-08-14

## 2024-08-14 RX ORDER — COLCHICINE 0.6 MG/1
0.6 TABLET ORAL 2 TIMES DAILY
Status: DISCONTINUED | OUTPATIENT
Start: 2024-08-14 | End: 2024-08-14 | Stop reason: HOSPADM

## 2024-08-14 RX ORDER — COLCHICINE 0.6 MG/1
1.2 TABLET ORAL ONCE
Status: DISCONTINUED | OUTPATIENT
Start: 2024-08-14 | End: 2024-08-14

## 2024-08-14 RX ADMIN — IOHEXOL 100 ML: 350 INJECTION, SOLUTION INTRAVENOUS at 02:46

## 2024-08-14 RX ADMIN — COLCHICINE 0.6 MG: 0.6 TABLET ORAL at 06:09

## 2024-08-14 RX ADMIN — KETOROLAC TROMETHAMINE 15 MG: 30 INJECTION, SOLUTION INTRAMUSCULAR; INTRAVENOUS at 10:56

## 2024-08-14 RX ADMIN — ACETAMINOPHEN 650 MG: 325 TABLET, FILM COATED ORAL at 08:30

## 2024-08-14 RX ADMIN — KETOROLAC TROMETHAMINE 15 MG: 30 INJECTION, SOLUTION INTRAMUSCULAR; INTRAVENOUS at 04:05

## 2024-08-14 RX ADMIN — IBUPROFEN 400 MG: 400 TABLET, FILM COATED ORAL at 14:04

## 2024-08-14 RX ADMIN — ASPIRIN 81 MG: 81 TABLET, CHEWABLE ORAL at 08:30

## 2024-08-14 RX ADMIN — ACETAMINOPHEN 650 MG: 325 TABLET, FILM COATED ORAL at 14:04

## 2024-08-14 NOTE — ED PROVIDER NOTES
"History  Chief Complaint   Patient presents with    Chest Pain     L sided CP, began earlier today, was seen at [facility]     29 y.o. female presents with a chief complaint of \"it feels like someone is stabbing me in the back and front.\"    Patient affirms 2 days of left chest pain with radiation to the back. Patient describes stabbing that came on suddenly and continues in the ER. Patient states movement and breathing worsens the pain and nothing improves the pain.   Patient affirms pleuritic component to chest pain. Patient affirms exertional component to the chest pain.  Patient took aspirin and acetaminophen without relief.    Patient affirms dyspnea stating \"I been having shortness of breath seems like yesterday\" but states that \"today my chest just went like crazy.\"    Patient denies fever/chills.  Patient denies diaphoresis.  Patient denies cough.  Patient denies hemoptysis.  Patient denies vomiting.  Patient denies leg pain or swelling.    Patient affirms ongoing episodes of abdominal pain that she states have been occurring since \"forever\" but states that it has been worse today, also on the left side.  Patient states her symptoms are similar to when she was diagnosed with lymphoma so she contacted her hematologist ordered CT imaging at [facility].  Imaging is unavailable though it appears to demonstrate a new pericardial effusion and pleural effusion, it does not appear to clearly identify whether there was any pulmonary embolisms other than no central pulmonary emboli.    The patient denies a history of atherosclerotic disease (CAD/TIA/CVA/PAD).    Patient denies any history of hypertension.  Patient denies hyperlipidemia.   Patient denies diabetes.  Patient denies obesity.  Patient denies any early family history of CAD (male less than 56yo or female less than 64 yo).    Patient denies any use of tobacco in the past 90 days.    Patient denies any use of illicit drugs, including cocaine, other than " marijuana.    Patient denies any immobilization of at least 3 days or surgery in the past 4 weeks.    Patient denies any history of DVT or PE.    Patient denies any history or family history of thrombophilia.  Patient affirms any malignancy with treatment within the past 6 months.   Patient denies any use of exogenous estrogen containing compounds.    Focused Objective.  CV:  Normal inspection with no rash, signs of infection, or trauma.  Tachycardic rate and regular rhythm. No murmur. Peripheral pulses intact and equal.  Nontender to palpitation over area of patient's reported pain.  Respiratory:  Lungs clear to auscultation bilaterally without adventitious sounds.  Skin:  Warm and dry.  No rash or signs of herpes zoster over area of patient's reported pain.  Extremities:  Non-tender lower extremities without asymmetry; no clinical signs of DVT. No lower extremity edema.      Medical Decision Making    Patient presenting with chest pain with a broad differential, including multiple emergent etiologies.      Patient has a history of B-cell lymphoma which increases the evaluation of the complexity of their presenting complaint.    External review of the medical record including prior hematology notes.  Note the patient completed CHOP R treatment and is currently under surveillance by hematology.    EKG obtained and reviewed independently by myself, which was interpreted by myself as sinus tachycardia rate of 102.  There is ST elevation in multiple leads with VT changes concerning for potential pericarditis, no reciprocal changes that would be clearly concerning for ACS however there is no prior to compare.  Patient placed on cardiac monitoring.    Regarding the possibility for ACS, patient's risk stratification.      HEART score:    History 1=Moderate suspicious  ECG 1=Nonspecific repolarization disturbance  Age 0= < 45 years  Risk Factors 0= No risk factors known  Troponin 1= Between 13-35 ng/L  Total 3       Score  0-3: 1.7% had a MACE risk    0.4% (1 patient)     36.4% of patients were in this low risk group    Score 4-6: 16.6% had a MACE risk    Score 7-10: 50.1% had a MACE risk       The patient's HEART score is 3.  CXR will be obtained to evaluate for alternative pathologies, including pneumothorax or pneumonia.  Laboratory analysis including troponin to evaluate for ACS, CBC to evaluate for anemia or leukocytosis, and BMP to evaluate renal function and electrolytes considering possibility of cardiac involvement.     Patient has symptoms and history concerning for possible pulmonary embolism.  Regarding this etiology, patient's risk stratification by the Wells' Criteria for Pulmonary Embolism (Two Tier Model):  no - clinical signs or symptoms of DVT (3)  no - PE most likely diagnosis (3)  yes - Heart rate greater than 100 (1.5)  no - Immobilization at least 3 days OR surgery in the previous 4 weeks (1.5)  no - Previous, objectively diagnosed PE or DVT (1.5)  no - Hemoptysis (1)  yes - Malignancy with treatment within 6 months or palliative (1)    Patient's risk further evaluated by the PERC Criteria for Pulmonary Embolism:  no - age is greater than or equal to 50  yes - Heart rate greater than 100  no - O2 sat on room air < 95%  no - Prior history of PE or DVT  no - Recent trauma or surgery  no - Hemoptysis  no - Use of exogenous estrogen  no - Unilateral leg swelling    Patient has a low risk Wells' criteria but is unable to be cleared via the PERC criteria.  As such, a D-Dimer will be ordered for the patient to evaluate for the potential for pulmonary embolism.  If positive, CT imaging of the patient's chest will be obtained to evaluate for potential pulmonary embolism.  Note the patient had CT imaging yesterday though it is unclear if it was time for evaluation of pulmonary embolism as the report only notes no central pulmonary emboli and we are unable to view images.    The patient's HEART score indicates a  lower-moderate risk regarding the possibility of ACS.   In accordance with our established guidance, patient will be risk stratified based on their initial troponin to determine whether a second troponin will be obtained 2 hours after the initial troponin to evaluate whether the patient is appropriate for discharge from the emergency department for continued outpatient follow up.  Patient on continuous cardiac monitoring and has been instructed to inform nursing with any change in the character or severity of their chest pain so repeat EKG can be obtained.        None       Past Medical History:   Diagnosis Date    Lymphoma (HCC)        Past Surgical History:   Procedure Laterality Date    CT NEEDLE BIOPSY MEDIASTINUM  12/28/2023    LAPAROSCOPIC OOPHORECTOMY Right     US GUIDED THYROID BIOPSY  12/29/2023       No family history on file.  I have reviewed and agree with the history as documented.    E-Cigarette/Vaping     E-Cigarette/Vaping Substances     Social History     Tobacco Use    Smoking status: Never    Smokeless tobacco: Never   Substance Use Topics    Alcohol use: Not Currently    Drug use: Yes     Types: Marijuana       Review of Systems    Physical Exam  Physical Exam    Vital Signs  ED Triage Vitals   Temperature Pulse Respirations Blood Pressure SpO2   08/13/24 2348 08/13/24 2348 08/13/24 2348 08/13/24 2348 08/13/24 2348   98 °F (36.7 °C) 105 18 167/89 98 %      Temp Source Heart Rate Source Patient Position - Orthostatic VS BP Location FiO2 (%)   08/14/24 0213 08/14/24 0213 -- 08/14/24 0213 --   Oral Monitor  Right arm       Pain Score       08/14/24 0405       8           Vitals:    08/14/24 0430 08/14/24 0500 08/14/24 0530 08/14/24 0600   BP: 107/69 114/75 112/70 112/78   Pulse: 94 104 95 102         Visual Acuity      ED Medications  Medications   acetaminophen (TYLENOL) tablet 650 mg (has no administration in time range)   iohexol (OMNIPAQUE) 350 MG/ML injection (MULTI-DOSE) 100 mL (100 mL  Intravenous Given 8/14/24 0246)   ketorolac (TORADOL) injection 15 mg (15 mg Intravenous Given 8/14/24 0405)   colchicine (COLCRYS) tablet 0.6 mg (0.6 mg Oral Given 8/14/24 0609)       Diagnostic Studies  Results Reviewed       Procedure Component Value Units Date/Time    HS Troponin 0hr (reflex protocol) [[id number]]     Lab Status: No result Specimen: Blood     HS Troponin I 4hr [[id number]] Collected: 08/14/24 0613    Lab Status: In process Specimen: Blood from Arm, Left Updated: 08/14/24 0617    HS Troponin I 2hr [[id number]]  (Normal) Collected: 08/14/24 0202    Lab Status: Final result Specimen: Blood from Arm, Left Updated: 08/14/24 0229     hs TnI 2hr 17 ng/L      Delta 2hr hsTnI 1 ng/L     POCT pregnancy, urine [[id number]]  (Normal) Resulted: 08/14/24 0215    Lab Status: Final result Updated: 08/14/24 0215     EXT Preg Test, Ur Negative     Control Valid    HS Troponin 0hr (reflex protocol) [[id number]]  (Normal) Collected: 08/13/24 2357    Lab Status: Final result Specimen: Blood from Arm, Left Updated: 08/14/24 0024     hs TnI 0hr 16 ng/L     Comprehensive metabolic panel [[id number]] Collected: 08/13/24 2357    Lab Status: Final result Specimen: Blood from Arm, Left Updated: 08/14/24 0016     Sodium 137 mmol/L      Potassium 3.8 mmol/L      Chloride 100 mmol/L      CO2 27 mmol/L      ANION GAP 10 mmol/L      BUN 6 mg/dL      Creatinine 0.70 mg/dL      Glucose 111 mg/dL      Calcium 9.9 mg/dL      AST 15 U/L      ALT 7 U/L      Alkaline Phosphatase 76 U/L      Total Protein 7.5 g/dL      Albumin 4.7 g/dL      Total Bilirubin 0.59 mg/dL      eGFR 115 ml/min/1.73sq m     Narrative:      National Kidney Disease Foundation guidelines for Chronic Kidney Disease (CKD):     Stage 1 with normal or high GFR (GFR > 90 mL/min/1.73 square meters)    Stage 2 Mild CKD (GFR = 60-89 mL/min/1.73 square meters)    Stage 3A Moderate CKD (GFR = 45-59 mL/min/1.73 square meters)    Stage 3B Moderate CKD (GFR = 30-44 mL/min/1.73  square meters)    Stage 4 Severe CKD (GFR = 15-29 mL/min/1.73 square meters)    Stage 5 End Stage CKD (GFR <15 mL/min/1.73 square meters)  Note: GFR calculation is accurate only with a steady state creatinine    CBC and differential [[id number]]  (Abnormal) Collected: 08/13/24 9872    Lab Status: Final result Specimen: Blood from Arm, Left Updated: 08/14/24 0001     WBC 9.79 Thousand/uL      RBC 4.61 Million/uL      Hemoglobin 12.2 g/dL      Hematocrit 36.9 %      MCV 80 fL      MCH 26.5 pg      MCHC 33.1 g/dL      RDW 13.1 %      MPV 9.5 fL      Platelets 297 Thousands/uL      nRBC 0 /100 WBCs      Segmented % 73 %      Immature Grans % 1 %      Lymphocytes % 14 %      Monocytes % 11 %      Eosinophils Relative 1 %      Basophils Relative 0 %      Absolute Neutrophils 7.12 Thousands/µL      Absolute Immature Grans 0.09 Thousand/uL      Absolute Lymphocytes 1.36 Thousands/µL      Absolute Monocytes 1.12 Thousand/µL      Eosinophils Absolute 0.06 Thousand/µL      Basophils Absolute 0.04 Thousands/µL                    CT pe study w abdomen pelvis w contrast   Final Result by Ricardo Henderson DO (08/14 0326)      No pulmonary embolism.      Moderate pericardial effusion.      Small left pleural effusion with left basilar subsegmental atelectasis.         Workstation performed: [id number]         XR chest 2 views   Final Result by Sebastian Irving MD (08/14 0431)      No acute cardiopulmonary disease.      Similar to CT except for inability to visualize mediastinal air identified by CT, which has likely resolved      Workstation performed: [id number]                    Procedures  Procedures         ED Course  ED Course as of 08/14/24 0622   Wed Aug 14, 2024   0601 Patient CT demonstrating moderate pericardial effusion which is worsened from yesterday's report though the direct images are unavailable.  Patient's EKG with findings consistent with pericarditis, negative troponins.  Patient does not have signs or  symptoms of cardiac tamponade.  Discussed with cardiology best method to obtain quick echocardiogram considering progression of symptoms.  Suggest admission for continued monitoring and evaluation.  Patient started on NSAIDs we will start patient on colchicine.                                 SBIRT 22yo+      Flowsheet Row Most Recent Value   Initial Alcohol Screen: US AUDIT-C     1. How often do you have a drink containing alcohol? 0 Filed at: 08/14/2024 0233   2. How many drinks containing alcohol do you have on a typical day you are drinking?  0 Filed at: 08/14/2024 0233   3b. FEMALE Any Age, or MALE 65+: How often do you have 4 or more drinks on one occassion? 0 Filed at: 08/14/2024 0233   Audit-C Score 0 Filed at: 08/14/2024 0233   DION: How many times in the past year have you...    Used an illegal drug or used a prescription medication for non-medical reasons? Never Filed at: 08/14/2024 0233                      Medical Decision Making  Amount and/or Complexity of Data Reviewed  Labs: ordered.  Radiology: ordered.    Risk  Prescription drug management.  Decision regarding hospitalization.                 Disposition  Final diagnoses:   Pericarditis   Pericardial effusion     Time reflects when diagnosis was documented in both MDM as applicable and the Disposition within this note       Time User Action Codes Description Comment    8/14/2024  6:04 AM Garret Goldsmith Add [I31.9] Pericarditis     8/14/2024  6:04 AM Garret Goldsmith Add [I31.39] Pericardial effusion           ED Disposition       ED Disposition   Admit    Condition   Stable    Date/Time   Wed Aug 14, 2024 0604    Comment   Case was discussed with QUANG and the patient's admission status was agreed to be Admission Status: observation status to the service of Dr. Castro .               Follow-up Information    None         Patient's Medications    No medications on file       No discharge procedures on file.    PDMP Review       None            ED  Provider  Electronically Signed by             Garret Goldsmith MD  08/14/24 0622

## 2024-08-14 NOTE — ASSESSMENT & PLAN NOTE
CT yesterday showed small pericardial effusion and today shows moderate pericardial effusion  Echocardiogram obtained showing small pericardial effusion  And is mentioned above

## 2024-08-14 NOTE — H&P
VTE Pharmacologic Prophylaxis:   Moderate Risk (Score 3-4) - Pharmacological DVT Prophylaxis Contraindicated. Sequential Compression Devices Ordered.  Code Status: Level 1 - Full Code confirmed with patient  Discussion with family: Updated  (significant other) at bedside.    Anticipated Length of Stay: Patient will be admitted on an observation basis with an anticipated length of stay of less than 2 midnights secondary to chest pain.    Total Time Spent on Date of Encounter in care of patient: 45 mins. This time was spent on one or more of the following: performing physical exam; counseling and coordination of care; obtaining or reviewing history; documenting in the medical record; reviewing/ordering tests, medications or procedures; communicating with other healthcare professionals and discussing with patient's family/caregivers.    Chief Complaint: Chest pain    History of Present Illness:  Dean Lancaster is a 29 y.o. female with a PMH of B-cell lymphoma, previous history of pericardial effusion, s/p chemo completed 3 weeks ago who came here with pleuritic chest pain. Pt reports that the chest pain sharp in nature, changes with position, gets worse when she lays on the left side, started yesterday.  Patient reports associated shortness of breath on exertion , chills.  Patient denies palpitations, lightheadedness, nausea, vomiting, fever, chills, cough.  EKG shows findings that look like pericarditis  Trops flat at 16>17   CT chest negative for PE , showed moderate pericardial effusion, which has increased in size comparing to CT yesterday which showed only small effusion. Small left pleural effusion with left basilar subsegmental atelectasis  Patient received colchicine 0.6 mg and dose of Toradol in ED   On my evaluation patient appears not in acute distress, hemodynamically stable.      Review of Systems:  Review of Systems   Constitutional:  Positive for activity change, chills and diaphoresis.  Negative for appetite change, fatigue, fever and unexpected weight change.   HENT: Negative.     Eyes: Negative.    Respiratory:  Positive for shortness of breath. Negative for apnea, cough, choking, chest tightness, wheezing and stridor.    Cardiovascular:  Positive for chest pain. Negative for palpitations and leg swelling.   Gastrointestinal: Negative.    Endocrine: Negative.    Genitourinary: Negative.    Musculoskeletal: Negative.    Neurological: Negative.    Hematological: Negative.    Psychiatric/Behavioral: Negative.         Past Medical and Surgical History:   Past Medical History:   Diagnosis Date    Lymphoma (HCC)        Past Surgical History:   Procedure Laterality Date    CT NEEDLE BIOPSY MEDIASTINUM  12/28/2023    LAPAROSCOPIC OOPHORECTOMY Right     US GUIDED THYROID BIOPSY  12/29/2023       Meds/Allergies:  Prior to Admission medications    Not on File     I have reviewed home medications with patient personally.    Allergies:   Allergies   Allergen Reactions    Shellfish Allergy - Food Allergy Anaphylaxis    Sertraline Rash       Social History:  Marital Status: Single   Occupation:   Patient Pre-hospital Living Situation: Home  Patient Pre-hospital Level of Mobility: walks  Patient Pre-hospital Diet Restrictions:   Substance Use History:   Social History     Substance and Sexual Activity   Alcohol Use Not Currently     Social History     Tobacco Use   Smoking Status Never   Smokeless Tobacco Never     Social History     Substance and Sexual Activity   Drug Use Yes    Types: Marijuana       Family History:      Physical Exam:     Vitals:   Blood Pressure: 111/78 (08/14/24 0630)  Pulse: 92 (08/14/24 0630)  Temperature: 98.1 °F (36.7 °C) (08/14/24 0213)  Temp Source: Oral (08/14/24 0213)  Respirations: 16 (08/14/24 0630)  Weight - Scale: 59 kg (130 lb) (08/13/24 5248)  SpO2: 98 % (08/14/24 0630)    Physical Exam  Constitutional:       General: She is not in acute distress.     Appearance: Normal  appearance. She is ill-appearing. She is not toxic-appearing or diaphoretic.   HENT:      Head: Normocephalic and atraumatic.      Mouth/Throat:      Mouth: Mucous membranes are moist.   Eyes:      Extraocular Movements: Extraocular movements intact.      Pupils: Pupils are equal, round, and reactive to light.   Cardiovascular:      Rate and Rhythm: Normal rate and regular rhythm.      Pulses: Normal pulses.      Heart sounds: Normal heart sounds. No murmur heard.     No friction rub. No gallop.   Pulmonary:      Effort: Pulmonary effort is normal. No respiratory distress.      Breath sounds: Normal breath sounds. No stridor. No wheezing, rhonchi or rales.   Chest:      Chest wall: No tenderness.   Abdominal:      General: Abdomen is flat. Bowel sounds are normal. There is no distension.      Palpations: Abdomen is soft. There is no mass.      Tenderness: There is no abdominal tenderness. There is no right CVA tenderness, left CVA tenderness, guarding or rebound.      Hernia: No hernia is present.   Musculoskeletal:      Cervical back: Normal range of motion and neck supple.   Skin:     General: Skin is warm and dry.      Capillary Refill: Capillary refill takes less than 2 seconds.      Coloration: Skin is not jaundiced or pale.      Findings: No bruising, erythema, lesion or rash.   Neurological:      General: No focal deficit present.      Mental Status: She is alert and oriented to person, place, and time. Mental status is at baseline.   Psychiatric:         Mood and Affect: Mood normal.         Behavior: Behavior normal.          Additional Data:     Lab Results:  Results from last 7 days   Lab Units 08/13/24  2357   WBC Thousand/uL 9.79   HEMOGLOBIN g/dL 12.2   HEMATOCRIT % 36.9   PLATELETS Thousands/uL 297   SEGS PCT % 73   LYMPHO PCT % 14   MONO PCT % 11   EOS PCT % 1     Results from last 7 days   Lab Units 08/13/24  2357   SODIUM mmol/L 137   POTASSIUM mmol/L 3.8   CHLORIDE mmol/L 100   CO2 mmol/L 27   BUN  mg/dL 6   CREATININE mg/dL 0.70   ANION GAP mmol/L 10   CALCIUM mg/dL 9.9   ALBUMIN g/dL 4.7   TOTAL BILIRUBIN mg/dL 0.59   ALK PHOS U/L 76   ALT U/L 7   AST U/L 15   GLUCOSE RANDOM mg/dL 111             Lab Results   Component Value Date    HGBA1C 5.4 12/27/2023    HGBA1C 5.1 07/28/2023           Lines/Drains:  Invasive Devices       Peripheral Intravenous Line  Duration             Peripheral IV 08/13/24 Left Antecubital <1 day                        Imaging: Reviewed radiology reports from this admission including: chest CT scan  CT pe study w abdomen pelvis w contrast   Final Result by Ricardo Henderson DO (08/14 0326)      No pulmonary embolism.      Moderate pericardial effusion.      Small left pleural effusion with left basilar subsegmental atelectasis.         Workstation performed: [id number]         XR chest 2 views   Final Result by Sebastian Irving MD (08/14 0431)      No acute cardiopulmonary disease.      Similar to CT except for inability to visualize mediastinal air identified by CT, which has likely resolved      Workstation performed: [id number]             EKG and Other Studies Reviewed on Admission:   EKG:  Diffuse ST elevations.    ** Please Note: This note has been constructed using a voice recognition system. **  [facility]  H&P  Name: Dean Lancaster 29 y.o. female I MRN: [id number]  Unit/Bed#: ED 12 I Date of Admission: 8/13/2024   Date of Service: 8/14/2024 I Hospital Day: 0      Assessment & Plan   Chest pain  Assessment & Plan  s/p chemo here with pleuritic chest pain  EKG shows findings that look like pericarditis  Trops flat at 16>17   Admit to telemetry  Trend troponins  Serial EKGs  Follow-up ECHO  F/u D-dimer  Cardiology consult        * Pericarditis  Assessment & Plan  Images on CT shows increase in pericardial effusion  She has a history of a pericardial effusion that had resolved after initial evaluation last year at [facility]  Since then saw heme and went  through [facility] and is in surveillance with heme at [facility].   Follow-up troponins, serial EKGs, ECHO  Started aspirin 81 mg daily and colchicine  Toradol as needed for pain    Pericardial effusion  Assessment & Plan  CT yesterday showed small pericardial effusion and today shows moderate pericardial effusion  Follow-up echo  Cardiology consult    B-cell lymphoma (HCC)  Assessment & Plan  Follows oncology  at [facility]  Pt went through [facility] and is in surveillance with heme at [facility].   Completed last chemo on April 19

## 2024-08-14 NOTE — DISCHARGE SUMMARY
[facility]  Discharge Summary  Name: Dean Lancaster I MRN: [id number]  Unit/Bed#: 2 [address] Date of Admission: 8/13/2024   Date of Service: 8/13/2024  I Hospital Day: 0    * Pericarditis  Assessment & Plan  Images on CT shows increase in pericardial effusion  Patient with history of a pericardial effusion that had resolved after initial evaluation last year at [facility]  Since then saw heme and went through [facility] and is in surveillance with heme at [facility].   Follow-up troponins, serial EKGs, ECHO  Continue aspirin 81 mg daily and colchicine  Cardiology evaluated  Echocardiogram showing small pericardial effusion  Recommend colchicine 0.6 mg twice daily x 3 months  Ibuprofen 400 mg 3 to 5 days  Repeat limited echo in 1 month  Outpatient follow-up with cardiology  Cleared for discharge    Pericardial effusion  Assessment & Plan  CT yesterday showed small pericardial effusion and today shows moderate pericardial effusion  Echocardiogram obtained showing small pericardial effusion  And is mentioned above    B-cell lymphoma (HCC)  Assessment & Plan  Follows oncology  at [facility]  Pt went through [facility] and is in surveillance with heme at [facility].   Completed last chemo on April 19    Chest pain  Assessment & Plan  s/p chemo here with pleuritic chest pain  EKG shows findings that look like pericarditis  Trops flat at 16>17>15  Overall symptomatically improved plan as mentioned above            Medical Problems       Resolved Problems  Date Reviewed: 8/14/2024   None       Discharging Physician / Practitioner: DIMITRI Wooten  PCP: No primary care provider on file.  Admission Date:   Admission Orders (From admission, onward)       Ordered        08/14/24 0605  Place in Observation  Once                          Discharge Date: 08/14/24    Consultations During Hospital Stay:  IP CONSULT TO CARDIOLOGY    Procedures Performed:   None    Significant Findings / Test Results:   XR chest 2 views    Result  Date: 8/14/2024  No acute cardiopulmonary disease. Similar to CT except for inability to visualize mediastinal air identified by CT, which has likely resolved Workstation performed: [id number]     CT pe study w abdomen pelvis w contrast    Result Date: 8/14/2024  No pulmonary embolism. Moderate pericardial effusion. Small left pleural effusion with left basilar subsegmental atelectasis. Workstation performed: [id number]     CTA chest wo w contrast    Result Date: 8/13/2024  Impression: 1.  Small circumferential pericardial effusion which is new compared to 05/31/2024. 2.  Small LEFT pleural effusion which is new compared to 05/31/2024. 3.  Mild nonspecific groundglass opacities involving the LEFT lower lobe. 4.  Borderline cardiomegaly. 5.  Diffuse enlargement of the partially visualized thyroid gland, with evidence of individual thyroid nodules measuring at least 1.9 cm. 6.  Poor evaluation of the known anterior mediastinal mass. 7.  No central or segmental pulmonary arterial embolus is identified. Thyroid nodule recommendation: Per Endocrine Society and American College of Radiology recommendations, further evaluation is recommended with thyroid ultrasound given nodule size greater than or equal to 1.5 cm. Workstation:[id number]      Incidental Findings:   None    Test Results Pending at Discharge (will require follow up):   None     Outpatient Tests Requested:  Limited echo 1 month    Complications: None    Reason for Admission: Chest pain suspected in setting of pericarditis    Hospital Course:   Dean Lancaster is a 29 y.o. female patient with  has a past medical history of Lymphoma (HCC). who originally presented to the hospital on 8/13/2024 due to Chest Pain (L sided CP, began earlier today, was seen at [facility]).  Patient noted to have a new pericardial effusion on imaging was recommended for admission by cardiology workup included a repeat echocardiogram that did confirm a small pericardial effusion cardiology  "is recommending treatment for pericarditis recommend colchicine 0.6 mg twice daily for 3 months and can use ibuprofen 400 mg for 3 to 5 days.  Patient will have a limited echo in 1 month and she has been overall doing medically cleared for discharge home.      Please see above list of diagnoses and related plan for additional information.     Condition at Discharge: good    Discharge Day Visit / Exam:   Subjective:    Patient does report feeling better when she came in since medications have been started appreciative for the recommendations and okay with discharging home.  Vitals: Blood Pressure: 121/88 (08/14/24 1314)  Pulse: 99 (08/14/24 1314)  Temperature: 98.3 °F (36.8 °C) (08/14/24 1314)  Temp Source: Oral (08/14/24 1314)  Respirations: 16 (08/14/24 1314)  Height: 5' 3\" (160 cm) (08/14/24 1409)  Weight - Scale: 59 kg (130 lb) (08/14/24 1409)  SpO2: 97 % (08/14/24 1314)  Exam:   Physical Exam  Constitutional:       Appearance: She is well-developed.   HENT:      Head: Normocephalic and atraumatic.   Eyes:      Conjunctiva/sclera: Conjunctivae normal.   Cardiovascular:      Rate and Rhythm: Normal rate and regular rhythm.      Heart sounds: Normal heart sounds.   Pulmonary:      Effort: Pulmonary effort is normal.      Breath sounds: Normal breath sounds.   Abdominal:      General: Bowel sounds are normal.      Palpations: Abdomen is soft.   Musculoskeletal:         General: Normal range of motion.      Cervical back: Normal range of motion.   Skin:     General: Skin is warm and dry.   Neurological:      Mental Status: She is alert and oriented to person, place, and time.   Psychiatric:         Behavior: Behavior normal.          Discussion with Family: Patient declined call to .     Discharge instructions/Information to patient and family:   See after visit summary for information provided to patient and family.      Provisions for Follow-Up Care:  See after visit summary for information related to " follow-up care and any pertinent home health orders.      Mobility at time of Discharge:   Basic Mobility Inpatient Raw Score: 24  JH-HLM Goal: 8: Walk 250 feet or more  JH-HLM Achieved: 6: Walk 10 steps or more  HLM goal met not updated at time of this documentation     Disposition:   Home    Planned Readmission: None     Discharge Statement:  I spent 33 minutes discharging the patient. This time was spent on the day of discharge. I had direct contact with the patient on the day of discharge. Greater than 50% of the total time was spent examining patient, answering all patient questions, arranging and discussing plan of care with patient as well as directly providing post-discharge instructions.  Additional time then spent on discharge activities.    Discharge Medications:  See after visit summary for reconciled discharge medications provided to patient and/or family.      **Please Note: This note may have been constructed using a voice recognition system**

## 2024-08-14 NOTE — ASSESSMENT & PLAN NOTE
Images on CT shows increase in pericardial effusion  Patient with history of a pericardial effusion that had resolved after initial evaluation last year at [facility]  Since then saw melissa and went through [facility] and is in surveillance with [facility] at [facility].   Follow-up troponins, serial EKGs, ECHO  Continue aspirin 81 mg daily and colchicine  Cardiology evaluated  Echocardiogram showing small pericardial effusion  Recommend colchicine 0.6 mg twice daily x 3 months  Ibuprofen 400 mg 3 to 5 days  Repeat limited echo in 1 month  Outpatient follow-up with cardiology  Cleared for discharge

## 2024-08-14 NOTE — CONSULTS
Consultation - Cardiology   Dean Lancaster 29 y.o. female MRN: [id number]  Unit/Bed#: 2 E 282-01 Encounter: [id number]  08/14/24  1:38 PM    Assessment/ Plan:  Pericarditis/pericardial effusion  Moderate by CT but mild by echo pericardial effusion  Increase colchicine to twice daily  Add ibuprofen 400 mg TID  Explained pathophysiology of Pericarditis/pericardial effusion  Advised about possible side effects of colchicine  Echocardiogram pending  Plan for OP echo in 1 month    2. Hx of B cell Lymphoma  Received chemo  Last treatment April    3. Anorexia  Advised to talk with PCP about appetite stimulants    Pending Echo results, pt is stable for DC from cardiac standpoint    History of Present Illness   Physician Requesting Consult: Lucille Castro MD    Reason for Consult / Principal Problem: pericarditis/ pericardial effusion    HPI: Dean Lancaster is a 29 y.o. year old female who presents with left-sided chest pain with radiation to the back that started about 2 days ago.  Patient described as a stabbing feeling that came on suddenly and continued while in the emergency room.  Patient states movement and breathing made it worse.  Patient affirmed a pleuritic component to the pain.  Patient took aspirin and Tylenol without relief. Pt also notes shortness of breath. Pt notes she was diagnosed with B cell lymphoma and she took chemotherapy and finished it up in April.  Patient also notes no appetite, she states she has brought this up to PCP who declines prescribing her anything.  Patient states after 1 bite of food she feels full.  Patient states she has lost about 15 pounds since her last chemo.    PMH: B-cell lymphoma    Inpatient consult to Cardiology  Consult performed by: Keisha Oconnell PA-C  Consult ordered by: Lucille Castro MD          EKG: Normal sinus rhythm, diffuse ST elevation with SC segment depression consistent with pericarditis      Review of Systems   Constitutional:  Positive for  "appetite change and unexpected weight change.   Respiratory: Negative.     Cardiovascular:  Positive for chest pain.   Neurological: Negative.    Hematological: Negative.    Psychiatric/Behavioral: Negative.     All other systems reviewed and are negative.      Historical Information   Past Medical History:   Diagnosis Date    Lymphoma (HCC)      Past Surgical History:   Procedure Laterality Date    CT NEEDLE BIOPSY MEDIASTINUM  2023    LAPAROSCOPIC OOPHORECTOMY Right     US GUIDED THYROID BIOPSY  2023     Social History     Substance and Sexual Activity   Alcohol Use Not Currently     Social History     Substance and Sexual Activity   Drug Use Yes    Types: Marijuana     Social History     Tobacco Use   Smoking Status Never   Smokeless Tobacco Never       Family History: No family history on file.    Meds/Allergies   all current active meds have been reviewed  Allergies   Allergen Reactions    Shellfish Allergy - Food Allergy Anaphylaxis    Sertraline Rash       Objective   Vitals: Blood pressure 121/88, pulse 99, temperature 98.1 °F (36.7 °C), temperature source Oral, resp. rate 22, height 5' 3\" (1.6 m), weight 59 kg (130 lb), last menstrual period 2024, SpO2 97%., Body mass index is 23.03 kg/m².,   Orthostatic Blood Pressures      Flowsheet Row Most Recent Value   Blood Pressure 121/88 filed at 2024 1314            Systolic (24hrs), Av , Min:101 , Max:167     Diastolic (24hrs), Av, Min:64, Max:95      No intake or output data in the 24 hours ending 24 1338    Invasive Devices       Peripheral Intravenous Line  Duration             Peripheral IV 24 Left Antecubital <1 day                        Physical Exam:  GEN: Alert and oriented x 3, in no acute distress.  Well appearing and well nourished.   HEENT: Sclera anicteric, conjunctivae pink, mucous membranes moist. Oropharynx clear.   NECK: Supple, no carotid bruits, no significant JVD. Trachea midline, no thyromegaly. "   HEART: Regular rhythm, normal S1 and S2, no murmurs, clicks, gallops or rubs. PMI nondisplaced, no thrills.   LUNGS: Clear to auscultation bilaterally; no wheezes, rales, or rhonchi. No increased work of breathing or signs of respiratory distress.   ABDOMEN: Soft, nontender, nondistended, normoactive bowel sounds.   EXTREMITIES: Skin warm and well perfused, no clubbing, cyanosis, or edema.  NEURO: No focal findings. Normal speech. Mood and affect normal.   SKIN: Normal without suspicious lesions on exposed skin.      Lab Results:     Troponins:   Results from last 7 days   Lab Units 08/14/24  0829 08/14/24  0643 08/14/24  0613 08/14/24  0202 08/13/24  2357   HS TNI 0HR ng/L  --   --  15  --  16   HS TNI 2HR ng/L 14  --   --  17  --    HS TNI 4HR ng/L  --  15  --   --   --    HSTNI D4 ng/L  --  -1  --   --   --        CBC with diff:   Results from last 7 days   Lab Units 08/13/24  2357   WBC Thousand/uL 9.79   HEMOGLOBIN g/dL 12.2   HEMATOCRIT % 36.9   MCV fL 80*   PLATELETS Thousands/uL 297   RBC Million/uL 4.61   MCH pg 26.5*   MCHC g/dL 33.1   RDW % 13.1   MPV fL 9.5   NRBC AUTO /100 WBCs 0         CMP:   Results from last 7 days   Lab Units 08/13/24  2357   POTASSIUM mmol/L 3.8   CHLORIDE mmol/L 100   CO2 mmol/L 27   BUN mg/dL 6   CREATININE mg/dL 0.70   CALCIUM mg/dL 9.9   AST U/L 15   ALT U/L 7   ALK PHOS U/L 76   EGFR ml/min/1.73sq m 115

## 2024-08-14 NOTE — ASSESSMENT & PLAN NOTE
s/p chemo here with pleuritic chest pain  EKG shows findings that look like pericarditis  Trops flat at 16>17   Admit to telemetry  Trend troponins  Serial EKGs  Follow-up ECHO  F/u D-dimer  Cardiology consult

## 2024-08-14 NOTE — ASSESSMENT & PLAN NOTE
Images on CT shows increase in pericardial effusion  She has a history of a pericardial effusion that had resolved after initial evaluation last year at [facility]  Since then saw melissa and went through [facility] and is in surveillance with [facility] at [facility].   Follow-up troponins, serial EKGs, ECHO  Started aspirin 81 mg daily and colchicine  Toradol as needed for pain

## 2024-08-14 NOTE — ASSESSMENT & PLAN NOTE
CT yesterday showed small pericardial effusion and today shows moderate pericardial effusion  Follow-up echo  Cardiology consult

## 2024-08-14 NOTE — ASSESSMENT & PLAN NOTE
Follows oncology  at [facility]  Pt went through [facility] and is in surveillance with heme at [facility].   Completed last chemo on April 19

## 2024-08-14 NOTE — TELEPHONE ENCOUNTER
----- Message from Keisha Oconnell PA-C sent at 8/14/2024  3:00 PM EDT -----  Regarding: Hosp FUP  Cardiology Follow-up:    Patient clinical visit in 1 month at the Cardio location: [place] office. .    Schedule visit with Cardio [place] Providers: first available provider.    Type of Visit: VISIT TYPE: in-person office visit.    Test ordered: Cardiac tests: echocardiogram limited to be done in 2 weeks    Additional Details: leaving hosp today

## 2024-08-14 NOTE — ASSESSMENT & PLAN NOTE
Follows oncology  at [facility]  Pt went through [facility] and is in surveillance with heme at [facility].   Completed last chemo on April 19

## 2024-08-14 NOTE — PROGRESS NOTES
[facility]  Progress Note  Name: Dean Lancaster I MRN: [id number]  Unit/Bed#: MO ECHOI Date of Admission: 8/13/2024   Date of Service: 8/13/2024  I Hospital Day: 0    * Pericarditis  Assessment & Plan  Images on CT shows increase in pericardial effusion  Patient with history of a pericardial effusion that had resolved after initial evaluation last year at [facility]  Since then saw heme and went through [facility] and is in surveillance with heme at [facility].   Follow-up troponins, serial EKGs, ECHO  Continue aspirin 81 mg daily and colchicine  Toradol as needed for pain  Continue Telemetry   Cardiology consult pending    Pericardial effusion  Assessment & Plan  CT yesterday showed small pericardial effusion and today shows moderate pericardial effusion  Echo pending  Cardiology consult pending     B-cell lymphoma (HCC)  Assessment & Plan  Follows oncology  at [facility]  Pt went through [facility] and is in surveillance with heme at [facility].   Completed last chemo on April 19    Chest pain  Assessment & Plan  s/p chemo here with pleuritic chest pain  EKG shows findings that look like pericarditis  Trops flat at 16>17>15  Continue telemetry  ECHO pending  F/u D-dimer  Cardiology consult pending               VTE Pharmacologic Prophylaxis: VTE Score: 2 Low Risk (Score 0-2) - Encourage Ambulation.    Mobility:      AM-PAC not assessed at time of this documentation    Patient Centered Rounds: I evaluated the patient without nursing staff present due to rounding with another patient    Discussions with Specialists or Other Care Team Provider: Waiting cardiology recommendations    Education and Discussions with Family / Patient: Patient declined call to .     Total Time Spent on Date of Encounter in care of patient: 30 mins. This time was spent on one or more of the following: performing physical exam; counseling and coordination of care; obtaining or reviewing history; documenting in the medical record;  reviewing/ordering tests, medications or procedures; communicating with other healthcare professionals and discussing with patient's family/caregivers.    Current Length of Stay: 0 day(s)  Current Patient Status: Observation   Certification Statement:  Awaiting recommendations from cardiology in setting of pericarditis  Discharge Plan:  Later today versus tomorrow    Code Status: Level 1 - Full Code    Subjective:   Patient reports chest pain is improved with rest however she has persistent symptoms when out of bed.  Denies dizziness currently and has no other complaints.    Objective:     Vitals:   Temp (24hrs), Av.1 °F (36.7 °C), Min:98 °F (36.7 °C), Max:98.1 °F (36.7 °C)    Temp:  [98 °F (36.7 °C)-98.1 °F (36.7 °C)] 98.1 °F (36.7 °C)  HR:  [] 93  Resp:  [14-22] 22  BP: (107-167)/(69-95) 118/81  SpO2:  [95 %-99 %] 98 %  Body mass index is 23.03 kg/m².     Input and Output Summary (last 24 hours):   No intake or output data in the 24 hours ending 24 0936    Physical Exam:   Physical Exam  Constitutional:       Appearance: She is well-developed.   HENT:      Head: Normocephalic and atraumatic.   Eyes:      Conjunctiva/sclera: Conjunctivae normal.   Cardiovascular:      Rate and Rhythm: Normal rate and regular rhythm.      Heart sounds: Normal heart sounds.   Pulmonary:      Effort: Pulmonary effort is normal.      Breath sounds: Normal breath sounds.   Abdominal:      General: Bowel sounds are normal.      Palpations: Abdomen is soft.   Musculoskeletal:         General: Normal range of motion.      Cervical back: Normal range of motion.   Skin:     General: Skin is warm and dry.   Neurological:      Mental Status: She is alert and oriented to person, place, and time.   Psychiatric:         Behavior: Behavior normal.          Additional Data:     Labs:  Results from last 7 days   Lab Units 24  2357   WBC Thousand/uL 9.79   HEMOGLOBIN g/dL 12.2   HEMATOCRIT % 36.9   PLATELETS Thousands/uL 297    SEGS PCT % 73   LYMPHO PCT % 14   MONO PCT % 11   EOS PCT % 1     Results from last 7 days   Lab Units 08/13/24  2357   SODIUM mmol/L 137   POTASSIUM mmol/L 3.8   CHLORIDE mmol/L 100   CO2 mmol/L 27   BUN mg/dL 6   CREATININE mg/dL 0.70   ANION GAP mmol/L 10   CALCIUM mg/dL 9.9   ALBUMIN g/dL 4.7   TOTAL BILIRUBIN mg/dL 0.59   ALK PHOS U/L 76   ALT U/L 7   AST U/L 15   GLUCOSE RANDOM mg/dL 111                       Lines/Drains:  Invasive Devices       Peripheral Intravenous Line  Duration             Peripheral IV 08/13/24 Left Antecubital <1 day                      Telemetry:  Telemetry Orders (From admission, onward)               24 Hour Telemetry Monitoring  Continuous x 24 Hours (Telem)        Question:  Reason for 24 Hour Telemetry  Answer:  Patients with afbrice/ritesh/endocarditis; cardiac contusion                     Telemetry Reviewed: Normal Sinus Rhythm  Indication for Continued Telemetry Use: Fabrice/ritesh/endocarditis             Imaging: Reviewed radiology reports from this admission including: chest CT scan    Recent Cultures (last 7 days):         Last 24 Hours Medication List:   Current Facility-Administered Medications   Medication Dose Route Frequency Provider Last Rate    acetaminophen  650 mg Oral Q6H PRN Lucille Castro MD      aspirin  81 mg Oral Daily Lucille Castro MD      [START ON 8/15/2024] colchicine  0.6 mg Oral Daily Lucille Castro MD      ketorolac  15 mg Intravenous Q6H [facility] Lucille Castro MD          Today, Patient Was Seen By: DIMITRI Wooten    **Please Note: This note may have been constructed using a voice recognition system.**

## 2024-08-28 ENCOUNTER — HOSPITAL ENCOUNTER (OUTPATIENT)
Dept: NON INVASIVE DIAGNOSTICS | Facility: CLINIC | Age: 29
Discharge: HOME | End: 2024-08-28
Payer: COMMERCIAL

## 2024-08-28 VITALS
SYSTOLIC BLOOD PRESSURE: 121 MMHG | BODY MASS INDEX: 23.04 KG/M2 | HEIGHT: 63 IN | WEIGHT: 130 LBS | HEART RATE: 99 BPM | DIASTOLIC BLOOD PRESSURE: 88 MMHG

## 2024-08-28 DIAGNOSIS — I31.9 PERICARDITIS: ICD-10-CM

## 2024-08-28 DIAGNOSIS — I31.39 PERICARDIAL EFFUSION: ICD-10-CM

## 2024-08-28 LAB
AORTIC ROOT: 3 CM
APICAL FOUR CHAMBER EJECTION FRACTION: 55 %
AV LVOT PEAK GRADIENT: 4 MMHG
AV PEAK GRADIENT: 10 MMHG
BSA FOR ECHO PROCEDURE: 1.61 M2
DOP CALC LVOT AREA: 3.14 CM2
DOP CALC LVOT DIAMETER: 2 CM
E WAVE DECELERATION TIME: 163 MS
E/A RATIO: 1.96
FRACTIONAL SHORTENING: 33 (ref 28–44)
INTERVENTRICULAR SEPTUM IN DIASTOLE (PARASTERNAL SHORT AXIS VIEW): 0.6 CM
INTERVENTRICULAR SEPTUM: 0.6 CM (ref 0.6–1.1)
LAAS-AP2: 19.2 CM2
LAAS-AP4: 19.8 CM2
LEFT ATRIUM SIZE: 2.8 CM
LEFT ATRIUM VOLUME (MOD BIPLANE): 65 ML
LEFT ATRIUM VOLUME INDEX (MOD BIPLANE): 40.4 ML/M2
LEFT INTERNAL DIMENSION IN SYSTOLE: 3.3 CM (ref 2.1–4)
LEFT VENTRICULAR INTERNAL DIMENSION IN DIASTOLE: 4.9 CM (ref 3.5–6)
LEFT VENTRICULAR POSTERIOR WALL IN END DIASTOLE: 0.6 CM
LEFT VENTRICULAR STROKE VOLUME: 68 ML
LVSV (TEICH): 68 ML
MV E'TISSUE VEL-SEP: 12 CM/S
MV PEAK A VEL: 0.5 M/S
MV PEAK E VEL: 98 CM/S
MV STENOSIS PRESSURE HALF TIME: 47 MS
MV VALVE AREA P 1/2 METHOD: 4.68
RIGHT ATRIUM AREA SYSTOLE A4C: 10.2 CM2
RIGHT VENTRICLE ID DIMENSION: 3.1 CM
SL CV LEFT ATRIUM LENGTH A2C: 4.6 CM
SL CV LV EF: 60
SL CV PED ECHO LEFT VENTRICLE DIASTOLIC VOLUME (MOD BIPLANE) 2D: 112 ML
SL CV PED ECHO LEFT VENTRICLE SYSTOLIC VOLUME (MOD BIPLANE) 2D: 44 ML
TR MAX PG: 24 MMHG
TR PEAK VELOCITY: 2.4 M/S
TRICUSPID VALVE PEAK REGURGITATION VELOCITY: 2.44 M/S

## 2024-08-28 PROCEDURE — 93308 TTE F-UP OR LMTD: CPT | Performed by: INTERNAL MEDICINE

## 2024-08-28 PROCEDURE — 93308 TTE F-UP OR LMTD: CPT

## 2024-08-29 ENCOUNTER — TELEPHONE (OUTPATIENT)
Dept: CARDIOLOGY CLINIC | Facility: CLINIC | Age: 29
End: 2024-08-29

## 2024-08-29 NOTE — TELEPHONE ENCOUNTER
Called pt and relayed Keisha Oconnell PA-C message. Pt verbalized understanding.    ----- Message from Keisha Oconnell PA-C sent at 8/28/2024  5:00 PM EDT -----  Pls call echo looked good, the fluid around the heart is still present but very minimal, nothing to worry about

## 2025-04-18 ENCOUNTER — HOSPITAL ENCOUNTER (EMERGENCY)
Facility: HOSPITAL | Age: 30
Discharge: HOME | End: 2025-04-18
Attending: EMERGENCY MEDICINE
Payer: COMMERCIAL

## 2025-04-18 VITALS
DIASTOLIC BLOOD PRESSURE: 65 MMHG | RESPIRATION RATE: 14 BRPM | TEMPERATURE: 97.5 F | SYSTOLIC BLOOD PRESSURE: 140 MMHG | HEART RATE: 99 BPM | OXYGEN SATURATION: 100 %

## 2025-04-18 DIAGNOSIS — O20.9 VAGINAL BLEEDING IN PREGNANCY, FIRST TRIMESTER: Primary | ICD-10-CM

## 2025-04-18 PROCEDURE — 99284 EMERGENCY DEPT VISIT MOD MDM: CPT

## 2025-04-18 NOTE — Clinical Note
Dean Lancaster was seen and treated in our emergency department on 4/18/2025.                Diagnosis:     Dean  may return to work on return date, is off the rest of the shift today.    She may return on this date: 04/21/2025         If you have any questions or concerns, please don't hesitate to call.      Yousuf Carlos PA-C    ______________________________           _______________          _______________  Hospital Representative                              Date                                Time

## 2025-04-19 NOTE — ED NOTES
Pt rang call light. Pt reported just getting off the phone with midwife. Reports midwife is requesting US and blood work to be checked. Providers at bedside with US.     Michelle Rodriguez RN  04/18/25 2100

## 2025-04-19 NOTE — DISCHARGE INSTRUCTIONS
Follow-up with PCP and your OB/GYN.  Obtain the ultrasound outpatient.  If any pain or bleeding worsen return to the ER.

## 2025-04-19 NOTE — ED PROVIDER NOTES
Time reflects when diagnosis was documented in both MDM as applicable and the Disposition within this note       Time User Action Codes Description Comment    2025  9:05 PM Yousuf Carlos Add [O20.9] Vaginal bleeding in pregnancy, first trimester           ED Disposition       ED Disposition   Discharge    Condition   Stable    Date/Time     9:05 PM    Comment   Dean Lancaster discharge to home/self care.                   Assessment & Plan       Medical Decision Making  This pregnant patient presents with vaginal bleeding in the first trimester. DDX includes ectopic, IUP, threatened/inevitable , along with completed . Patient is HDS and without a history of coagulopathy or infectious symptoms. Doubt alternate acute emergent pathology. Patient follows with [facility] and had documented single IUP on ultrasound on 25. Patient presents with spotting. IUP seen on Po[facility]US bedside with Dr. Daniels.   Plan: [facility], +/- basic labs, type and screen, POCUS bedside, reassess - patient refusing blood work and pelvic exam at this time and would like an ultrasound. Currently well-appearing with no bleeding or abdominal pain. Will write script for ultrasound and have patient closely follow up with OBGYN. Patient understands all risk factors are refusing certain diagnostics and treatment here.  Discussed strict return parameters.Prior to discharge, discharge instructions were discussed with patient at bedside. Patient was provided both verbal and written instructions. Patient is understanding of the discharge instructions and is agreeable to plan of care. Return precautions were discussed with patient bedside, patient verbalized understanding of signs and symptoms that would necessitate return to the ED. All questions were answered. Patient was comfortable with the plan of care and discharged to home.       Problems Addressed:  Vaginal bleeding in pregnancy, first trimester: acute illness or  injury    Amount and/or Complexity of Data Reviewed  Labs: ordered.  Radiology: ordered.        ED Course as of 04/19/25 0713   Fri Apr 18, 2025   2105 Patient refusing blood work at this time       Medications - No data to display    ED Risk Strat Scores                    No data recorded        SBIRT 22yo+      Flowsheet Row Most Recent Value   Initial Alcohol Screen: US AUDIT-C     1. How often do you have a drink containing alcohol? 0 Filed at: 04/18/2025 1944   2. How many drinks containing alcohol do you have on a typical day you are drinking?  0 Filed at: 04/18/2025 1944   3b. FEMALE Any Age, or MALE 65+: How often do you have 4 or more drinks on one occassion? 0 Filed at: 04/18/2025 1944   Audit-C Score 0 Filed at: 04/18/2025 1944   DION: How many times in the past year have you...    Used an illegal drug or used a prescription medication for non-medical reasons? Never Filed at: 04/18/2025 1944                            History of Present Illness       Chief Complaint   Patient presents with    Vaginal Bleeding - Pregnant     Pt c/o vaginal bleeding that started last night, pt also c/o abdominal cramping. Pt is 8 weeks pregnant        Past Medical History:   Diagnosis Date    Lymphoma (HCC)       Past Surgical History:   Procedure Laterality Date    LAPAROSCOPIC OOPHORECTOMY Right     US GUIDED THYROID BIOPSY  12/29/2023      History reviewed. No pertinent family history.   Social History     Tobacco Use    Smoking status: Never    Smokeless tobacco: Never   Substance Use Topics    Alcohol use: Not Currently    Drug use: Yes     Types: Marijuana      E-Cigarette/Vaping      E-Cigarette/Vaping Substances      I have reviewed and agree with the history as documented.     The patient is a 29 y.o. female with a history of lymphoma who presents to [place] [facility] with a chief complaint of vaginal bleeding. Symptoms began yesterday and have been intermittent since onset. Her pain is currently rated  as a 1/10 in severity and described as lower crampy abdominal pain without radiation. Associated symptoms include none noted. Symptoms are aggravated with none noted and alleviating factors include none noted. The patient denies fever, chills, night sweats, chest pain, shortness of breath, cough, sputum, dizziness, numbness, tingling, falls, trauma, abdominal pain currently.  No other reported symptoms at this time.  Patient affirms allergies to shellfish and sertraline             History provided by:  Patient   used: No        Review of Systems   Constitutional:  Negative for chills and fever.   HENT:  Negative for ear pain and sore throat.    Eyes:  Negative for pain and visual disturbance.   Respiratory:  Negative for cough and shortness of breath.    Cardiovascular:  Negative for chest pain and palpitations.   Gastrointestinal:  Negative for abdominal pain and vomiting.   Genitourinary:  Positive for vaginal bleeding. Negative for dysuria and hematuria.   Musculoskeletal:  Negative for arthralgias and back pain.   Skin:  Negative for color change and rash.   Neurological:  Negative for dizziness, tremors, seizures, syncope, facial asymmetry, speech difficulty, weakness, light-headedness, numbness and headaches.   All other systems reviewed and are negative.          Objective       ED Triage Vitals [04/18/25 1940]   Temperature Pulse Blood Pressure Respirations SpO2 Patient Position - Orthostatic VS   97.5 °F (36.4 °C) 99 140/65 14 100 % Sitting      Temp Source Heart Rate Source BP Location FiO2 (%) Pain Score    Temporal Monitor Left arm -- --      Vitals      Date and Time Temp Pulse SpO2 Resp BP Pain Score FACES Pain Rating User   04/18/25 1940 97.5 °F (36.4 °C) 99 100 % 14 140/65 -- -- NO            Physical Exam  Vitals reviewed.   Constitutional:       General: She is not in acute distress.     Appearance: Normal appearance. She is not ill-appearing or toxic-appearing.   HENT:       Head: Normocephalic.      Mouth/Throat:      Pharynx: Oropharynx is clear.   Eyes:      General: No scleral icterus.     Conjunctiva/sclera: Conjunctivae normal.   Neck:      Vascular: No carotid bruit.   Cardiovascular:      Rate and Rhythm: Normal rate.      Pulses: Normal pulses.   Pulmonary:      Effort: Pulmonary effort is normal. No respiratory distress.      Breath sounds: No stridor. No wheezing, rhonchi or rales.   Abdominal:      General: Abdomen is flat. Bowel sounds are normal.      Palpations: Abdomen is soft.      Tenderness: There is no abdominal tenderness. There is no right CVA tenderness, left CVA tenderness, guarding or rebound.   Musculoskeletal:         General: Normal range of motion.      Cervical back: Neck supple. No tenderness.      Right lower leg: No edema.      Left lower leg: No edema.   Skin:     General: Skin is warm and dry.      Capillary Refill: Capillary refill takes less than 2 seconds.      Coloration: Skin is not jaundiced or pale.      Findings: No bruising, erythema or lesion.   Neurological:      Mental Status: She is alert and oriented to person, place, and time. Mental status is at baseline.         Results Reviewed       None            US OB < 14 weeks with transvaginal    (Results Pending)       Procedures    ED Medication and Procedure Management   Prior to Admission Medications   Prescriptions Last Dose Informant Patient Reported? Taking?   Prenatal Vit-Fe Fumarate-FA (WESTAB PLUS PO)  Self Yes No   Sig: Take 1 tablet by mouth in the morning   aspirin 81 mg chewable tablet  Self Yes No   Sig: Chew 81 mg daily   colchicine (COLCRYS) 0.6 mg tablet   No No   Sig: Take 1 tablet (0.6 mg total) by mouth 2 (two) times a day   ibuprofen (MOTRIN) 400 mg tablet   No No   Sig: Take 1 tablet (400 mg total) by mouth every 8 (eight) hours for 5 days      Facility-Administered Medications: None     Discharge Medication List as of 4/18/2025  9:08 PM        CONTINUE these medications  which have NOT CHANGED    Details   aspirin 81 mg chewable tablet Chew 81 mg daily, Historical Med      colchicine (COLCRYS) 0.6 mg tablet Take 1 tablet (0.6 mg total) by mouth 2 (two) times a day, Starting Wed 8/14/2024, Until Fri 9/13/2024, Normal      ibuprofen (MOTRIN) 400 mg tablet Take 1 tablet (400 mg total) by mouth every 8 (eight) hours for 5 days, Starting Wed 8/14/2024, Until Mon 8/19/2024, Normal      Prenatal Vit-Fe Fumarate-FA (WESTAB PLUS PO) Take 1 tablet by mouth in the morning, Historical Med           Outpatient Discharge Orders   US OB < 14 weeks with transvaginal   Standing Status: Future Standing Exp. Date: 04/18/29     ED SEPSIS DOCUMENTATION   Time reflects when diagnosis was documented in both MDM as applicable and the Disposition within this note       Time User Action Codes Description Comment    4/18/2025  9:05 PM Yousuf Carlos Add [O20.9] Vaginal bleeding in pregnancy, first trimester                  Yousuf Carlos PA-C  04/19/25 0713

## 2025-04-19 NOTE — ED ATTENDING ATTESTATION
4/18/2025  I, Howard Daniels MD, saw and evaluated the patient. I have discussed the patient with the resident/non-physician practitioner and agree with the resident's/non-physician practitioner's findings, Plan of Care, and MDM as documented in the resident's/non-physician practitioner's note, except where noted. All available labs and Radiology studies were reviewed.  I was present for key portions of any procedure(s) performed by the resident/non-physician practitioner and I was immediately available to provide assistance.       At this point I agree with the current assessment done in the Emergency Department.  I have conducted an independent evaluation of this patient a history and physical is as follows:  Approximately 8 weeks pregnant.  IUP verified on ultrasound on 4/1.  Developed lower abdominal pain and vaginal bleeding.  No history of passing products of conception.  Physical exam: Hemodynamically stable.  Benign abdominal examination.  Assessment/plan.  Side ultrasound shows IUP.  Threatened miscarriage.  Appropriate for discharge and outpatient management.  ED Course         Critical Care Time  Procedures